# Patient Record
Sex: MALE | Race: WHITE | NOT HISPANIC OR LATINO
[De-identification: names, ages, dates, MRNs, and addresses within clinical notes are randomized per-mention and may not be internally consistent; named-entity substitution may affect disease eponyms.]

---

## 2020-01-13 PROBLEM — Z00.00 ENCOUNTER FOR PREVENTIVE HEALTH EXAMINATION: Status: ACTIVE | Noted: 2020-01-13

## 2020-02-06 ENCOUNTER — APPOINTMENT (OUTPATIENT)
Dept: NEPHROLOGY | Facility: CLINIC | Age: 55
End: 2020-02-06
Payer: COMMERCIAL

## 2020-02-06 VITALS
BODY MASS INDEX: 32.08 KG/M2 | HEART RATE: 72 BPM | WEIGHT: 250 LBS | DIASTOLIC BLOOD PRESSURE: 78 MMHG | HEIGHT: 74 IN | SYSTOLIC BLOOD PRESSURE: 132 MMHG

## 2020-02-06 PROCEDURE — 99204 OFFICE O/P NEW MOD 45 MIN: CPT

## 2020-02-06 NOTE — PHYSICAL EXAM
[General Appearance - Alert] : alert [General Appearance - In No Acute Distress] : in no acute distress [Sclera] : the sclera and conjunctiva were normal [PERRL With Normal Accommodation] : pupils were equal in size, round, and reactive to light [Extraocular Movements] : extraocular movements were intact [Outer Ear] : the ears and nose were normal in appearance [Oropharynx] : the oropharynx was normal [Neck Appearance] : the appearance of the neck was normal [Neck Cervical Mass (___cm)] : no neck mass was observed [Jugular Venous Distention Increased] : there was no jugular-venous distention [Thyroid Diffuse Enlargement] : the thyroid was not enlarged [Thyroid Nodule] : there were no palpable thyroid nodules [Auscultation Breath Sounds / Voice Sounds] : lungs were clear to auscultation bilaterally [Heart Sounds Gallop] : no gallops [Heart Rate And Rhythm] : heart rate was normal and rhythm regular [Heart Sounds] : normal S1 and S2 [Murmurs] : no murmurs [Full Pulse] : the pedal pulses are present [Heart Sounds Pericardial Friction Rub] : no pericardial rub [Bowel Sounds] : normal bowel sounds [Edema] : there was no peripheral edema [Abdomen Tenderness] : non-tender [Abdomen Soft] : soft [Cervical Lymph Nodes Enlarged Posterior Bilaterally] : posterior cervical [Abdomen Mass (___ Cm)] : no abdominal mass palpated [Supraclavicular Lymph Nodes Enlarged Bilaterally] : supraclavicular [Axillary Lymph Nodes Enlarged Bilaterally] : axillary [Cervical Lymph Nodes Enlarged Anterior Bilaterally] : anterior cervical [No CVA Tenderness] : no ~M costovertebral angle tenderness [Inguinal Lymph Nodes Enlarged Bilaterally] : inguinal [Femoral Lymph Nodes Enlarged Bilaterally] : femoral [No Spinal Tenderness] : no spinal tenderness [Abnormal Walk] : normal gait [Nail Clubbing] : no clubbing  or cyanosis of the fingernails [Motor Tone] : muscle strength and tone were normal [Musculoskeletal - Swelling] : no joint swelling seen [Skin Turgor] : normal skin turgor [Skin Color & Pigmentation] : normal skin color and pigmentation [] : no rash [Deep Tendon Reflexes (DTR)] : deep tendon reflexes were 2+ and symmetric [Sensation] : the sensory exam was normal to light touch and pinprick [No Focal Deficits] : no focal deficits [Oriented To Time, Place, And Person] : oriented to person, place, and time [Impaired Insight] : insight and judgment were intact [Affect] : the affect was normal

## 2020-02-06 NOTE — ASSESSMENT
[FreeTextEntry1] : Hypertension well controlled, both in office with Dr Price & at home. I consider 132/78 after stressful  drive in traffic today to be acceptable. Am concerned with high CV risk, but he does not want stain. Wt loss would be desirable, and would benefit BP, ANAHI, and lipids. Will see in 1 year. Edarbi already renewed, will renew diuretic now.

## 2020-02-06 NOTE — CONSULT LETTER
[Dear  ___] : Dear  [unfilled], [Please see my note below.] : Please see my note below. [Consult Letter:] : I had the pleasure of evaluating your patient, [unfilled]. [Consult Closing:] : Thank you very much for allowing me to participate in the care of this patient.  If you have any questions, please do not hesitate to contact me. [FreeTextEntry2] : Dr Doni Price ([facility]) [FreeTextEntry3] : Sincerely, \par \par Bran Lucero MD, FACP       Hi Dom - hope all is well! I'm enjoying [place] [facility]. Just went to nephrology conference at [place] today

## 2020-02-06 NOTE — CONSULT LETTER
[Dear  ___] : Dear  [unfilled], [Consult Letter:] : I had the pleasure of evaluating your patient, [unfilled]. [Please see my note below.] : Please see my note below. [Consult Closing:] : Thank you very much for allowing me to participate in the care of this patient.  If you have any questions, please do not hesitate to contact me. [FreeTextEntry2] : Dr Doni Price ([facility]) [FreeTextEntry3] : Sincerely, \par \par Bran Lucero MD, FACP       Hi Dom - hope all is well! I'm enjoying [place] [facility]. Just went to nephrology conference at [place] today

## 2020-02-06 NOTE — PHYSICAL EXAM
[General Appearance - Alert] : alert [General Appearance - In No Acute Distress] : in no acute distress [Sclera] : the sclera and conjunctiva were normal [PERRL With Normal Accommodation] : pupils were equal in size, round, and reactive to light [Extraocular Movements] : extraocular movements were intact [Outer Ear] : the ears and nose were normal in appearance [Oropharynx] : the oropharynx was normal [Neck Appearance] : the appearance of the neck was normal [Neck Cervical Mass (___cm)] : no neck mass was observed [Jugular Venous Distention Increased] : there was no jugular-venous distention [Thyroid Diffuse Enlargement] : the thyroid was not enlarged [Thyroid Nodule] : there were no palpable thyroid nodules [Auscultation Breath Sounds / Voice Sounds] : lungs were clear to auscultation bilaterally [Heart Sounds] : normal S1 and S2 [Heart Rate And Rhythm] : heart rate was normal and rhythm regular [Heart Sounds Gallop] : no gallops [Murmurs] : no murmurs [Full Pulse] : the pedal pulses are present [Heart Sounds Pericardial Friction Rub] : no pericardial rub [Bowel Sounds] : normal bowel sounds [Edema] : there was no peripheral edema [Abdomen Soft] : soft [Abdomen Tenderness] : non-tender [Cervical Lymph Nodes Enlarged Posterior Bilaterally] : posterior cervical [Abdomen Mass (___ Cm)] : no abdominal mass palpated [Cervical Lymph Nodes Enlarged Anterior Bilaterally] : anterior cervical [Supraclavicular Lymph Nodes Enlarged Bilaterally] : supraclavicular [Axillary Lymph Nodes Enlarged Bilaterally] : axillary [Femoral Lymph Nodes Enlarged Bilaterally] : femoral [No CVA Tenderness] : no ~M costovertebral angle tenderness [Inguinal Lymph Nodes Enlarged Bilaterally] : inguinal [No Spinal Tenderness] : no spinal tenderness [Abnormal Walk] : normal gait [Nail Clubbing] : no clubbing  or cyanosis of the fingernails [Musculoskeletal - Swelling] : no joint swelling seen [Motor Tone] : muscle strength and tone were normal [Skin Turgor] : normal skin turgor [Skin Color & Pigmentation] : normal skin color and pigmentation [] : no rash [Deep Tendon Reflexes (DTR)] : deep tendon reflexes were 2+ and symmetric [Sensation] : the sensory exam was normal to light touch and pinprick [No Focal Deficits] : no focal deficits [Oriented To Time, Place, And Person] : oriented to person, place, and time [Impaired Insight] : insight and judgment were intact [Affect] : the affect was normal

## 2020-02-11 NOTE — HISTORY OF PRESENT ILLNESS
[Primary] : primary hypertension [FreeTextEntry1] : 53 yo man with hx of HBP, referred by Dr Doni Price ([facility]) - has been on edarbi 80 qd,and chlorthalidone 12.5 2x/wk. Also hyperlipidemic, but resisted statin. Had borderline echo LVH 3 yrs ago, and calcium score of 160 on EBT. BP was 210/74 at peak exercise on stress test in 2017. Both parents were hypertensive, no clues to secondary causes. No hx kidney disease

## 2020-02-11 NOTE — HISTORY OF PRESENT ILLNESS
[Primary] : primary hypertension [FreeTextEntry1] : 55 yo man with hx of HBP, referred by Dr Doni Price ([facility]) - has been on edarbi 80 qd,and chlorthalidone 12.5 2x/wk. Also hyperlipidemic, but resisted statin. Had borderline echo LVH 3 yrs ago, and calcium score of 160 on EBT. BP was 210/74 at peak exercise on stress test in 2017. Both parents were hypertensive, no clues to secondary causes. No hx kidney disease

## 2020-06-17 ENCOUNTER — RX RENEWAL (OUTPATIENT)
Age: 55
End: 2020-06-17

## 2020-07-02 ENCOUNTER — APPOINTMENT (OUTPATIENT)
Dept: NEPHROLOGY | Facility: CLINIC | Age: 55
End: 2020-07-02
Payer: COMMERCIAL

## 2020-07-02 VITALS
BODY MASS INDEX: 32.08 KG/M2 | SYSTOLIC BLOOD PRESSURE: 108 MMHG | HEART RATE: 68 BPM | HEIGHT: 74 IN | WEIGHT: 250 LBS | DIASTOLIC BLOOD PRESSURE: 62 MMHG

## 2020-07-02 PROCEDURE — 99215 OFFICE O/P EST HI 40 MIN: CPT

## 2020-07-02 NOTE — PHYSICAL EXAM
[General Appearance - In No Acute Distress] : in no acute distress [General Appearance - Alert] : alert [Outer Ear] : the ears and nose were normal in appearance [PERRL With Normal Accommodation] : pupils were equal in size, round, and reactive to light [Sclera] : the sclera and conjunctiva were normal [Neck Appearance] : the appearance of the neck was normal [Neck Cervical Mass (___cm)] : no neck mass was observed [Jugular Venous Distention Increased] : there was no jugular-venous distention [Auscultation Breath Sounds / Voice Sounds] : lungs were clear to auscultation bilaterally [Heart Rate And Rhythm] : heart rate was normal and rhythm regular [Heart Sounds] : normal S1 and S2 [Heart Sounds Pericardial Friction Rub] : no pericardial rub [Murmurs] : no murmurs [Heart Sounds Gallop] : no gallops [Abnormal Walk] : normal gait [Edema] : there was no peripheral edema [Nail Clubbing] : no clubbing  or cyanosis of the fingernails [Musculoskeletal - Swelling] : no joint swelling seen [Motor Tone] : muscle strength and tone were normal [] : no rash [Skin Turgor] : normal skin turgor [Skin Color & Pigmentation] : normal skin color and pigmentation [No Focal Deficits] : no focal deficits [Sensation] : the sensory exam was normal to light touch and pinprick [Deep Tendon Reflexes (DTR)] : deep tendon reflexes were 2+ and symmetric [Impaired Insight] : insight and judgment were intact [Affect] : the affect was normal [Oriented To Time, Place, And Person] : oriented to person, place, and time

## 2020-07-02 NOTE — ASSESSMENT
[FreeTextEntry1] : 53 yo M with uncontrolled HBP x 3 wks - his home cuff is standard size, too small - overestimates BP. He will now exercise regularly, which helps a lot. I gave Rx for amlodipine 5mg for prn use, stay on edarbi/CTD.    Discussed situation with pt/wife. Asked to purchase new lg adult cuff

## 2020-07-02 NOTE — HISTORY OF PRESENT ILLNESS
[FreeTextEntry1] : 53 yo m with HBP, HLD, fatty liver, LVH, Calcium score 160 - BP controlled last 3 yrs with edarbi 80 qd, chlorthalidone 12.5 2x/wk (ortho hypo when took more). /78 in Feb here, but high last 3 wks. 130-140/90 home, with standard cuff - then oscar to 170/90 with HA - went to local ER. Labs /EKG ok, BP high then fell to 131/74 at d/c. He is under great stress lately , & had stopped exercise. Now walking nightly.

## 2021-01-06 ENCOUNTER — RX RENEWAL (OUTPATIENT)
Age: 56
End: 2021-01-06

## 2021-02-02 ENCOUNTER — TRANSCRIPTION ENCOUNTER (OUTPATIENT)
Age: 56
End: 2021-02-02

## 2021-02-02 ENCOUNTER — APPOINTMENT (OUTPATIENT)
Dept: NEPHROLOGY | Facility: CLINIC | Age: 56
End: 2021-02-02
Payer: COMMERCIAL

## 2021-02-02 VITALS
DIASTOLIC BLOOD PRESSURE: 72 MMHG | HEIGHT: 74 IN | WEIGHT: 250 LBS | HEART RATE: 72 BPM | BODY MASS INDEX: 32.08 KG/M2 | SYSTOLIC BLOOD PRESSURE: 118 MMHG

## 2021-02-02 PROCEDURE — 99443: CPT

## 2021-02-02 NOTE — ASSESSMENT
[FreeTextEntry1] : 55-year-old man with multiple cardiovascular risk factors, whose hypertension is well controlled on Edarbi 80 mg daily.  His insurance raised the price considerably, so I sent him to [facility] pharmacy, where he is now paying $50 a month.  I have made him aware of [email or web address] in [place], where the price is $40 per month.  He will send me reports of his calcium score and echo once they are back.  I particularly am looking to see if his LV mass has improved after 3 years of very good control and use of high-dose ARB.  I explained to him that any decrease in left ventricular wall thickness is a big positive in terms of cardiac prognosis. Rufino has shown that regression of LVH is associated with a 75% reduction in cardiovascular risk.  Time spent 21 minutes

## 2021-02-02 NOTE — HISTORY OF PRESENT ILLNESS
[Home] : at home, [unfilled] , at the time of the visit. [Medical Office: ([facility])___] : at the medical office located in  [Verbal consent obtained from patient] : the patient, [unfilled] [FreeTextEntry1] : Discussed with patient : You have chosen to receive care through the use of tele-media.  It enables healthcare providers at different locations to provide safe, effective, and convenient care through the use of technology.  Please note this is a billable encounter.  As with any healthcare service, there are risks associated with the use of tele-media, including  issues.  You understand that I cannot physically examine you and that you may need to come to the office to complete the assessment.   Patient agreed verbally and understands the risks and benefits of tele-media as explained.  All questions regarding tele-media encounters were answered.\par         55-year-old man with a history of hypertension, hyperlipidemia, fatty liver, echo LVH, and an elevated calcium score of 160 in the past -so in short, very high cardiovascular risk profile.  Hypertension has been well controlled for 3 years with a combination of Edarbi 80 mg daily and chlorthalidone 12.5 mg twice a week.  He became orthostatic when chlorthalidone was given more frequently than that.  His BP has been consistently normal, checked regularly at home.  The highest reading was 132/72.  He is usually 1 10-1 20 systolic.  He works out daily, but less aerobically since the pandemic started.  He does lift very regularly.  He and his whole family had Covid in November, but cases were mild and he recovered nicely.  He saw his cardiologist and his stress test was normal.  2 more studies are pending, namely calcium score and echocardiogram.

## 2021-05-17 ENCOUNTER — RX RENEWAL (OUTPATIENT)
Age: 56
End: 2021-05-17

## 2021-11-15 ENCOUNTER — RX RENEWAL (OUTPATIENT)
Age: 56
End: 2021-11-15

## 2022-03-07 ENCOUNTER — APPOINTMENT (OUTPATIENT)
Dept: NEPHROLOGY | Facility: CLINIC | Age: 57
End: 2022-03-07
Payer: COMMERCIAL

## 2022-03-07 VITALS
SYSTOLIC BLOOD PRESSURE: 110 MMHG | HEIGHT: 74 IN | WEIGHT: 220 LBS | DIASTOLIC BLOOD PRESSURE: 62 MMHG | BODY MASS INDEX: 28.23 KG/M2

## 2022-03-07 DIAGNOSIS — G47.30 SLEEP APNEA, UNSPECIFIED: ICD-10-CM

## 2022-03-07 PROCEDURE — 99442: CPT

## 2022-03-07 RX ORDER — AMLODIPINE BESYLATE 5 MG/1
5 TABLET ORAL DAILY
Qty: 90 | Refills: 3 | Status: DISCONTINUED | COMMUNITY
Start: 2020-07-02 | End: 2022-03-07

## 2022-03-07 NOTE — ASSESSMENT
[FreeTextEntry1] : 56-year-old man with well-controlled hypertension, who has now become hypotensive at times with symptoms, as result of 35 pound deliberate weight loss.  I have asked him to stop chlorthalidone and we will discuss his readings in the next several weeks.  If they continue to remain low and he is lightheaded, I will cut the dose of Edarbi to 40 mg daily.  I also will review his echo to see whether there has been any progression of LVH with better BP control.  Time spent 13 minutes

## 2022-03-07 NOTE — HISTORY OF PRESENT ILLNESS
[Home] : at home, [unfilled] , at the time of the visit. [Medical Office: ([facility])___] : at the medical office located in  [Verbal consent obtained from patient] : the patient, [unfilled] [FreeTextEntry1] : Discussed with patient : You have chosen to receive care through the use of tele-media.  It enables healthcare providers at different locations to provide safe, effective, and convenient care through the use of technology.  Please note this is a billable encounter.  As with any healthcare service, there are risks associated with the use of tele-media, including  issues.  You understand that I cannot physically examine you and that you may need to come to the office to complete the assessment.   Patient agreed verbally and understands the risks and benefits of tele-media as explained.  All questions regarding tele-media encounters were answered.\par                      56-year-old man with a history of hypertension, hyperlipidemia, fatty liver, echo LVH, and an elevated calcium score of 160 in the past.  He has deliberately lost weight with dietary adjustments, totaling 35 pounds in the last year.  His repeat calcium score was never done, but he did have an echocardiogram and he will have the results forwarded to me.  He has been feeling lightheaded at times lately, with BPs generally of 108-115/58-65, but he did drop to the 80-90 systolic range when he felt dizzy.  He is on chlorthalidone 12.5 mg twice a week and Edarbi 80 mg daily.

## 2022-03-07 NOTE — REVIEW OF SYSTEMS
[Recent Weight Loss (___ Lbs)] : recent [unfilled] ~Ulb weight loss [Negative] : Heme/Lymph [FreeTextEntry2] : 325 lbs

## 2023-05-01 ENCOUNTER — RX RENEWAL (OUTPATIENT)
Age: 58
End: 2023-05-01

## 2023-05-04 ENCOUNTER — RX RENEWAL (OUTPATIENT)
Age: 58
End: 2023-05-04

## 2023-05-04 ENCOUNTER — APPOINTMENT (OUTPATIENT)
Dept: NEPHROLOGY | Facility: CLINIC | Age: 58
End: 2023-05-04
Payer: COMMERCIAL

## 2023-05-04 PROCEDURE — 99443: CPT

## 2023-05-04 RX ORDER — CHLORTHALIDONE 25 MG/1
25 TABLET ORAL DAILY
Qty: 45 | Refills: 2 | Status: DISCONTINUED | COMMUNITY
Start: 2020-07-02 | End: 2023-05-04

## 2023-05-04 RX ORDER — CHLORTHALIDONE 25 MG/1
25 TABLET ORAL DAILY
Qty: 45 | Refills: 2 | Status: DISCONTINUED | COMMUNITY
Start: 2020-02-06 | End: 2023-05-04

## 2023-05-04 NOTE — HISTORY OF PRESENT ILLNESS
[FreeTextEntry1] : Discussed with patient : You have chosen to receive care through the use of tele-media.  It enables healthcare providers at different locations to provide safe, effective, and convenient care through the use of technology.  Please note this is a billable encounter.  As with any healthcare service, there are risks associated with the use of tele-media, including  issues.  You understand that I cannot physically examine you and that you may need to come to the office to complete the assessment.   Patient agreed verbally and understands the risks and benefits of tele-media as explained.  All questions regarding tele-media encounters were answered.\par                                                                                                                                                                                                                   57-year-old man with a long history of hypertension, hyperlipidemia, fatty liver, echo LVH, elevated calcium score, and obesity with ANAHI.  He lost 35 pounds and his BP improved dramatically so that we were able to stop chlorthalidone.  He remains on Edarbi 80 mg daily, but his BP is averaging about 115/68 at home and he does feel lightheaded at times.  I have suggested he can reduce Edarbi to 40 mg daily, and I am ordering a repeat calcium score, and echocardiogram to see if his LVH has regressed, and labs to include A1c, CBC, CMP, PSA, urinalysis, lipid profile.  He has a host of cardiovascular risk factors, and it would be very reassuring to see regression of LVH, a fatty liver, and stability or improvement in coronary calcium score. Time spent 22 min

## 2024-02-04 ENCOUNTER — RX RENEWAL (OUTPATIENT)
Age: 59
End: 2024-02-04

## 2024-02-04 RX ORDER — AZILSARTAN KAMEDOXOMIL 80 MG/1
80 TABLET ORAL
Qty: 90 | Refills: 1 | Status: ACTIVE | COMMUNITY
Start: 2020-01-13 | End: 1900-01-01

## 2024-05-13 ENCOUNTER — APPOINTMENT (OUTPATIENT)
Dept: NEPHROLOGY | Facility: CLINIC | Age: 59
End: 2024-05-13
Payer: COMMERCIAL

## 2024-05-13 VITALS — WEIGHT: 220 LBS | BODY MASS INDEX: 28.23 KG/M2 | HEIGHT: 74 IN

## 2024-05-13 DIAGNOSIS — I51.7 CARDIOMEGALY: ICD-10-CM

## 2024-05-13 DIAGNOSIS — K76.0 FATTY (CHANGE OF) LIVER, NOT ELSEWHERE CLASSIFIED: ICD-10-CM

## 2024-05-13 DIAGNOSIS — R93.1 ABNORMAL FINDINGS ON DIAGNOSTIC IMAGING OF HEART AND CORONARY CIRCULATION: ICD-10-CM

## 2024-05-13 DIAGNOSIS — E78.5 HYPERLIPIDEMIA, UNSPECIFIED: ICD-10-CM

## 2024-05-13 DIAGNOSIS — I10 ESSENTIAL (PRIMARY) HYPERTENSION: ICD-10-CM

## 2024-05-13 DIAGNOSIS — I25.10 ATHEROSCLEROTIC HEART DISEASE OF NATIVE CORONARY ARTERY W/OUT ANGINA PECTORIS: ICD-10-CM

## 2024-05-13 PROCEDURE — 99442: CPT

## 2024-05-13 NOTE — ASSESSMENT
[FreeTextEntry1] : 58-year-old man with very substantial cardiac risk factors who is not short of breath or experiencing chest pain, but clearly has significant atherosclerosis.  I strongly urged him to start a statin and consultation with his cardiologist.  Time spent 12 minutes

## 2024-05-13 NOTE — PHYSICAL EXAM
[General Appearance - Alert] : alert [General Appearance - In No Acute Distress] : in no acute distress [Deep Tendon Reflexes (DTR)] : deep tendon reflexes were 2+ and symmetric [Sensation] : the sensory exam was normal to light touch and pinprick [No Focal Deficits] : no focal deficits [Impaired Insight] : insight and judgment were intact [Oriented To Time, Place, And Person] : oriented to person, place, and time [Affect] : the affect was normal

## 2024-05-13 NOTE — HISTORY OF PRESENT ILLNESS
[Home] : at home, [unfilled] , at the time of the visit. [Medical Office: ([facility])___] : at the medical office located in  [Verbal consent obtained from patient] : the patient, [unfilled] [FreeTextEntry1] : Discussed with patient : You have chosen to receive care through the use of tele-media.  It enables healthcare providers at different locations to provide safe, effective, and convenient care through the use of technology.  Please note this is a billable encounter.  As with any healthcare service, there are risks associated with the use of tele-media, including  issues.  You understand that I cannot physically examine you and that you may need to come to the office to complete the assessment.   Patient agreed verbally and understands the risks and benefits of tele-media as explained.  All questions regarding tele-media encounters were answered.                                   58-year-old man with a history of hypertension, hyperlipidemia, LVH, sleep apnea, fatty liver, and an elevated coronary calcium score of 775.  His BP has been well-controlled on Edarbi 80 mg daily.  He has been exercising 30 minutes 3-4 times per week.  His cholesterol is said to be somewhat elevated and I do not have the most recent results, but his cardiologist would like him on a statin.  I reassured him that it is appropriate and needed with all of his cardiac risk factors.

## 2024-09-21 ENCOUNTER — RX RENEWAL (OUTPATIENT)
Age: 59
End: 2024-09-21